# Patient Record
Sex: FEMALE | NOT HISPANIC OR LATINO | URBAN - METROPOLITAN AREA
[De-identification: names, ages, dates, MRNs, and addresses within clinical notes are randomized per-mention and may not be internally consistent; named-entity substitution may affect disease eponyms.]

---

## 2018-01-10 NOTE — MISCELLANEOUS
History of Present Illness  TCM Communication [facility]: The patient is being contacted for follow-up after hospitalization  She was hospitalized at [facility] [place]  The date of discharge: 4/25/16  She was discharged to home  Medications reviewed and updated today  Communication performed and completed by      Active Problems    1  Chronic pain syndrome (338 4) (G89 4)   2  GERD (gastroesophageal reflux disease) (530 81) (K21 9)   3  Hyperlipidemia (272 4) (E78 5)   4  Hypertension (401 9) (I10)   5  Lumbosacral spinal stenosis (724 02) (M48 07)   6  Osteoporosis (733 00) (M81 0)    Past Medical History    1  History of Breast Cancer (V10 3)   2  History of Colonoscopy (Fiberoptic)   3  History of Encounter for screening mammogram for malignant neoplasm of breast   (V76 12) (Z12 31)   4  History of acute sinusitis (V12 69) (Z87 09)   5  History of backache (V13 59) (Z87 39)   6  History of transient cerebral ischemia (V12 54) (Z86 73)   7  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)    Surgical History    1  History of Breast Surgery Mastectomy   2  History of Total Abdominal Hysterectomy   3  History of Total Hip Replacement    Family History  Mother    1  Family history of essential hypertension (V17 49) (Z82 49)  Father    2  Family history of essential hypertension (V17 49) (Z82 49)  Sister    3  Family history of essential hypertension (V17 49) (Z82 49)    Social History    · Denied: History of Alcohol Use (History)   · Denied: History of Drug Use   · Living alone (V60 3) (Z60 2)   · Never A Smoker   · No drug use   · Retired   ·     Current Meds   1  AmLODIPine Besylate 5 MG Oral Tablet; take 1 tab daily; Therapy: 62GZT6208 to (Verna Goldsmith)  Requested for: 32HRT5974; Last   Rx:26Gcv6842 Ordered   2  Atenolol 50 MG Oral Tablet; Take 1 tablet daily; Therapy: 39IPQ2406 to (Evaluate:93Yft1248)  Requested for: 35Hxt7613; Last   Rx:56Doj9798 Ordered   3   Etodolac 400 MG Oral Tablet; Therapy: (Recorded:07Mar2014) to Recorded   4  Lidocaine 5 % External Patch; Therapy: (Recorded:07Mar2014) to Recorded   5  Losartan Potassium-HCTZ 100-25 MG Oral Tablet; take 1 tablet once daily; Therapy: 32UJN2420 to (Evaluate:12Aug2016)  Requested for: 76GZB9605; Last   Rx:96Avr8174 Ordered   6  Pantoprazole Sodium 40 MG Oral Tablet Delayed Release; take 1 tablet every day; Therapy: 97OWU2732 to (Evaluate:12Oct2016)  Requested for: 36LWU8160; Last   Rx:16Jan2016 Ordered   7  Simvastatin 20 MG Oral Tablet; TAKE 1 TABLET DAILY; Therapy: 61AKZ6447 to (Evaluate:73Obs9532)  Requested for: 66Lkr4791; Last   Rx:[id number] Ordered    Allergies    1   Plavix TABS    Signatures   Electronically signed by : [facility]; May 25 2016 11:56AM EST                       (Author)    Electronically signed by : [facility]; May 25 2016 11:56AM EST                       (Author)    Electronically signed by : FAROOQ Mary ; May 25 2016 12:20PM EST

## 2018-01-16 NOTE — CONSULTS
Assessment  Assessed    1  Lumbosacral spinal stenosis (724 02) (M48 07)   2  Chronic pain syndrome (338 4) (G89 4)    Plan  Lumbosacral spinal stenosis    · Procedure Flowsheet; Status:Complete;   Done: 06FNY9589 01:15PM   Performed: In Office; QBY:[id number];SDAAWJZ; For:Lumbosacral spinal stenosis; Ordered By:Lesvia Reynaga; Complete risks and benefits of the procedure were reviewed including bleeding, infection, tissue reaction, allergic reaction and nerve injury with verbal and written consent being obtained  Discussion/Summary    Patient is an 80-year-old female who presents today with chronic pain due to lumbosacral neuritis  She has undergone physical therapy, multiple epidural steroid injections without much relief of pain  She seeks other means to help manage her pain symptoms  I do believe the patient may be a good candidate for a spinal cord stimulator for chronic pain  I reviewed the nature of the neurostimulation in detail, discussed the role of the trial as well as the permanent implantation  The technology as well as the approach was demonstrated using models and additional literature, DVD was provided  We will obtain the requisite psychological authorization/clearance to rule out any significant psychological comorbidity that would prevent the patient from benefiting  In addition, will obtain MRI of the thoracic spine to rule out any significant stenosis that would prevent lead passage  We will make arrangements for the patient to participate in an patient education session as well  Complete risks and benefits including bleeding, infection, tissue reaction, allergic reaction, nerve injury, paralysis, CSF leak/spinal headache were reviewed  The treatment plan was reviewed with the patient/guardian  The patient/guardian understands and agrees with the treatment plan   The treatment plan was reviewed with the patient/guardian   The patient/guardian understands and agrees with the treatment plan   The patient was counseled regarding instructions for management, risk factor reductions, patient and family education, impressions, risks and benefits of treatment options and importance of compliance with treatment  Chief Complaint  Chief Complaints    1  Back Pain    History of Present Illness  This is an 59-year-old female who presents with complaints of chronic low back and right leg pain  Of note, patient reports a history of arthritis in her low dose of the spine  Today she reports moderate pain 8/10 on the visual analog scale  The pain is nearly constant, worse in the morning and afternoon  Patient further describes pain as burning, cramping with numbness and pins and needle sensation down the right leg  She further states that her pain symptoms is aggravated with prolonged standing, bending, sitting, walking and exercise  The patient has done physical therapy in the past without much relief of pain  Patient also has had lumbar epidural steroid injections which provided very temporary relief  She states that she is not interested in injections at this time and she would like to seek other alternative to manage her pain  She takes Aleve when necessary for relief  She denies loss of bladder or bowel  She denies fever or chills  Referring physician is  Dr Katina Inman physician is  Dr Stan Rios   the patient states they have pain  The pain is located in the low back  The pain radiates to the r buttock, r leg to r foot  The patient describes the pain as dull, aching, burning, throbbing and cramping  (on a scale of 0 to 10, the patient rates the pain at 6, 6, at times ranging as high as 8 )   Abuse And Domestic Violence Screen    Yes, the patient is safe at home  The patient states no one is hurting them  Depression And Suicide Screen  No, the patient has not had thoughts of hurting themself  No, the patient has not felt depressed in the past 7 days  Prefered Language is  englishenglish  Readiness To Learn: Receptive  Barriers To Learning: none  Preferred Learning: written and verbal   Education Completed: further treatment/follow-up and treatment/procedure   Teaching Method: verbal and written   Person Taught: patient   Evaluation Of Learning: verbalized/demonstrated understanding     Bud Cárdenas presents with complaints of gradual onset of constant episodes of moderate bilateral lower back pain, described as dull, aching, burning, throbbing and tingling, radiating to the right buttock, right thigh, right lower leg and right foot  On a scale of 1 to 10, the patient rates the pain as 6  The symptoms resulted from a fall  Symptoms are improved by rest, ice, heat and acetaminophen  Symptoms are worsening  Pertinent Medical History: DJD of back, sciatica and spinal fracture  Review of Systems    Constitutional: no fever, no recent weight gain and no recent weight loss  Eyes: no double vision and no blurry vision  Cardiovascular: no chest pain, no palpitations and no lower extremity edema  Respiratory: no complaints of shortness of breath and no wheezing  Musculoskeletal: difficulty walking and limb swelling r leg, but no muscle weakness, no joint stiffness, no joint swelling, no pain in extremity and no decreased range of motion  Neurological: no dizziness, no difficulty swallowing, no memory loss, no loss of consciousness and no seizures  Gastrointestinal: no nausea, no vomiting, no constipation and no diarrhea  Genitourinary: no difficulty initiating urine stream, no genital pain and no frequent urination  Integumentary: no complaints of skin rash  Psychiatric: no depression  Endocrine: no excessive thirst, no adrenal disease, no hypothyroidism and no hyperthyroidism  Hematologic/Lymphatic: no tendency for easy bruising and no tendency for easy bleeding  Active Problems  Problems    1   GERD (gastroesophageal reflux disease) (530 81) (K21 9)   2  Hyperlipidemia (272 4) (E78 5)   3  Hypertension (401 9) (I10)   4  Lumbosacral spinal stenosis (724 02) (M48 07)   5  Osteoporosis (733 00) (M81 0)    Past Medical History  Problems    1  History of Breast Cancer (V10 3)   2  History of Colonoscopy (Fiberoptic)   3  History of Encounter for screening mammogram for malignant neoplasm of breast   (V76 12) (Z12 31)   4  History of acute sinusitis (V12 69) (Z87 09)   5  History of backache (V13 59) (Z87 39)   6  History of transient cerebral ischemia (V12 54) (Z86 73)   7  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)    The active problems and past medical history were reviewed and updated today  Surgical History  Problems    1  History of Breast Surgery Mastectomy   2  History of Total Abdominal Hysterectomy   3  History of Total Hip Replacement    The surgical history was reviewed and updated today  Family History    The family history was reviewed and updated today  Social History  Problems    · Denied: History of Alcohol Use (History)   · Denied: History of Drug Use   · Living alone (V60 3) (Z60 2)   · Never A Smoker   · No drug use   · Retired   ·   The social history was reviewed and updated today  The social history was reviewed and is unchanged  Current Meds   1  AmLODIPine Besylate 5 MG Oral Tablet; take 1 tab daily; Therapy: 18DFA3885 to (Neptali Marie)  Requested for: 70SSZ8016; Last   Rx:36Rwc1187 Ordered   2  Atenolol 50 MG Oral Tablet; Take 1 tablet daily; Therapy: 62LYJ4297 to (Evaluate:92Lxe1762)  Requested for: 78Cyz4386; Last   Rx:24Ida3493 Ordered   3  Etodolac 400 MG Oral Tablet; Therapy: (Recorded:07Mar2014) to Recorded   4  Lidocaine 5 % External Patch; Therapy: (Recorded:07Mar2014) to Recorded   5  Losartan Potassium-HCTZ 100-25 MG Oral Tablet; take 1 tablet once daily; Therapy: 20OYI5735 to (Evaluate:45Mgd4960)  Requested for: 81CON7445;  Last   Rx:51Bdb8683 Ordered   6  Pantoprazole Sodium 40 MG Oral Tablet Delayed Release; take 1 tablet every day; Therapy: 93RTS4855 to (Evaluate:12Oct2016)  Requested for: 34XAT1086; Last   Rx:16Jan2016 Ordered   7  Simvastatin 20 MG Oral Tablet; TAKE 1 TABLET DAILY; Therapy: 02ZTK8786 to (Evaluate:29Ffe8794)  Requested for: 65Vsw5081; Last   Rx:80Qpe2244 Ordered    The medication list was reviewed and updated today  Allergies  Medication    1  Plavix TABS    Vitals  Vital Signs [Data Includes: Current Encounter]    Recorded: 83HMJ1261 01:09PM   Temperature 97 5 F   Heart Rate 60   Respiration 16   Systolic 337   Diastolic 78   Height 5 ft 4 5 in   Weight 194 lb 4 00 oz   BMI Calculated 32 83   BSA Calculated 1 94   Pain Scale 6     Physical Exam    Constitutional   General appearance: Well developed, well nourished, alert, in no distress, non-toxic and no overt pain behavior  Eyes   Sclera: anicteric   HEENT   Hearing grossly intact  Neck   Neck: Supple, symmetric, trachea midline, no masses  Pulmonary   Respiratory effort: Even and unlabored  Cardiovascular   Examination of extremities: No edema or pitting edema present  Pedal pulses: 2+ bilaterally  Skin   Skin and subcutaneous tissue: Normal without rashes or lesions, well hydrated  Psychiatric   Mood and affect: Mood and affect appropriate  Neurologic   Cranial nerves: Cranial nerves II-XII grossly intact  the muscle tone was normal   Musculoskeletal Tandem Gait: Intact   Lumbar/Sacral Spine examination demonstrates Lumbosacral Spine:   Appearance: Normal  Spinal alignment exhibits normal lordosis  Tenderness: at lumbar spine and right paraspinal  Palpatory Findings include right-sided muscle spasms  Lumbosacral Spine Sensory:     Lumbosacral sensory exam of the right side demonstrates L4 decreased sensation , L5 decreased sensation  and S1 decreased sensation   ROM Lumbosacral Spine: Full    ROM Hips: Full   Foot and ankle strength was normal bilaterally  Knee strength was normal bilaterally  Hip strength was normal bilaterally  Evaluation of Muscle Stretch Reflexes on the right side demonstrates 1/4 Knee Jerk Reflex and 1/4 Ankle Jerk Reflex  Evaluation of Muscle Stretch Reflexes on the left side demonstrates 3/4 Hamstring Reflex and 3/4 Ankle Jerk Reflex, but muscle stretch reflexes equal and symmetric right lower limbs and 2/4 Knee Jerk Reflex  Special Tests: positive Straight Leg Raise on right  Results/Data  Encounter Results   Procedure Flowsheet 03WRO3411 01:15PM Anne Kizzy     Test Name Result Flag Reference   Domestic Violence Screen [id number]     Oswestry Score 50         Results     Other  oswestry score-50        Signatures   Electronically signed by : Mariaa Booth MD; Jan 22 2016  1:52PM EST                       (Author)

## 2024-01-25 ENCOUNTER — TELEPHONE (OUTPATIENT)
Dept: NEUROLOGY | Facility: CLINIC | Age: 89
End: 2024-01-25

## 2024-01-25 NOTE — TELEPHONE ENCOUNTER
Patient's daughter in law calling to schedule new patient appointment for memory loss.  She is not on communication consent so she was told she needs to call back with patient.  She verbalized understanding.

## 2024-06-28 ENCOUNTER — TELEPHONE (OUTPATIENT)
Age: 89
End: 2024-06-28

## 2024-06-28 NOTE — TELEPHONE ENCOUNTER
Pt's daughter in law called  to make an NEW PT  appt for memory loss. Suyapa Linares Daughter in law states she's on the consent form. I did not find anything on chart . I did a warm tranfers to senior care.     Thank you